# Patient Record
Sex: MALE | Race: WHITE | NOT HISPANIC OR LATINO | Employment: OTHER | URBAN - METROPOLITAN AREA
[De-identification: names, ages, dates, MRNs, and addresses within clinical notes are randomized per-mention and may not be internally consistent; named-entity substitution may affect disease eponyms.]

---

## 2017-09-18 DIAGNOSIS — R93.89 ABNORMAL CT OF THE CHEST: Primary | ICD-10-CM

## 2017-09-21 ENCOUNTER — HOSPITAL ENCOUNTER (OUTPATIENT)
Dept: RADIOLOGY | Facility: HOSPITAL | Age: 62
Discharge: HOME | End: 2017-09-21
Attending: INTERNAL MEDICINE
Payer: COMMERCIAL

## 2017-09-21 DIAGNOSIS — R93.89 ABNORMAL CT OF THE CHEST: ICD-10-CM

## 2017-09-21 PROCEDURE — 71250 CT THORAX DX C-: CPT | Mod: 26,,, | Performed by: RADIOLOGY

## 2017-09-21 PROCEDURE — 71250 CT THORAX DX C-: CPT | Mod: TC

## 2017-09-26 ENCOUNTER — PATIENT MESSAGE (OUTPATIENT)
Dept: PULMONOLOGY | Facility: CLINIC | Age: 62
End: 2017-09-26

## 2019-04-30 ENCOUNTER — TELEPHONE (OUTPATIENT)
Dept: PULMONOLOGY | Facility: CLINIC | Age: 64
End: 2019-04-30

## 2019-04-30 DIAGNOSIS — J92.0 PLEURAL PLAQUE DUE TO ASBESTOS EXPOSURE: Primary | ICD-10-CM

## 2019-04-30 NOTE — TELEPHONE ENCOUNTER
----- Message from Katie Anthony MA sent at 4/29/2019  3:39 PM CDT -----  Contact: Self [phone]  Dr. Maddox can you please put an CT order in for this patient?    Thank You, Katie  ----- Message -----  From: Etta Hernandez  Sent: 4/29/2019   3:06 PM  To: [facility] Staff    PT is requesting an CT order. He prefers to have it done in [place].

## 2019-04-30 NOTE — TELEPHONE ENCOUNTER
----- Message from Katie Anthony MA sent at 4/29/2019  3:39 PM CDT -----  Contact: Self [phone]  Dr. Maddox can you please put an CT order in for this patient?    Thank You, Katie  ----- Message -----  From: Etta Hernandez  Sent: 4/29/2019   3:06 PM  To: [facility] Staff    PT is requesting an CT order. He prefers to have it done in [place].

## 2019-05-07 ENCOUNTER — HOSPITAL ENCOUNTER (OUTPATIENT)
Dept: RADIOLOGY | Facility: HOSPITAL | Age: 64
Discharge: HOME | End: 2019-05-07
Attending: INTERNAL MEDICINE
Payer: MEDICARE

## 2019-05-07 DIAGNOSIS — J92.0 PLEURAL PLAQUE DUE TO ASBESTOS EXPOSURE: ICD-10-CM

## 2019-05-07 PROCEDURE — 71250 CT THORAX DX C-: CPT | Mod: 26,,, | Performed by: RADIOLOGY

## 2019-05-07 PROCEDURE — 71250 CT THORAX DX C-: CPT | Mod: TC

## 2019-05-07 PROCEDURE — 71250 CT CHEST WITHOUT CONTRAST: ICD-10-PCS | Mod: 26,,, | Performed by: RADIOLOGY

## 2019-06-27 ENCOUNTER — OFFICE VISIT (OUTPATIENT)
Dept: PULMONOLOGY | Facility: CLINIC | Age: 64
End: 2019-06-27
Payer: MEDICARE

## 2019-06-27 VITALS
HEART RATE: 72 BPM | SYSTOLIC BLOOD PRESSURE: 124 MMHG | HEIGHT: 71 IN | OXYGEN SATURATION: 97 % | BODY MASS INDEX: 23.06 KG/M2 | DIASTOLIC BLOOD PRESSURE: 68 MMHG | WEIGHT: 164.69 LBS

## 2019-06-27 DIAGNOSIS — J92.0 PLEURAL PLAQUE DUE TO ASBESTOS EXPOSURE: ICD-10-CM

## 2019-06-27 PROCEDURE — 99999 PR PBB SHADOW E&M-EST. PATIENT-LVL III: ICD-10-PCS | Mod: PBBFAC,,, | Performed by: INTERNAL MEDICINE

## 2019-06-27 PROCEDURE — 3008F PR BODY MASS INDEX (BMI) DOCUMENTED: ICD-10-PCS | Mod: CPTII,S$GLB,, | Performed by: INTERNAL MEDICINE

## 2019-06-27 PROCEDURE — 3008F BODY MASS INDEX DOCD: CPT | Mod: CPTII,S$GLB,, | Performed by: INTERNAL MEDICINE

## 2019-06-27 PROCEDURE — 99213 OFFICE O/P EST LOW 20 MIN: CPT | Mod: S$GLB,,, | Performed by: INTERNAL MEDICINE

## 2019-06-27 PROCEDURE — 99999 PR PBB SHADOW E&M-EST. PATIENT-LVL III: CPT | Mod: PBBFAC,,, | Performed by: INTERNAL MEDICINE

## 2019-06-27 PROCEDURE — 99213 PR OFFICE/OUTPT VISIT, EST, LEVL III, 20-29 MIN: ICD-10-PCS | Mod: S$GLB,,, | Performed by: INTERNAL MEDICINE

## 2019-06-27 RX ORDER — OMEGA-3 FATTY ACIDS 1000 MG
2 CAPSULE ORAL
COMMUNITY

## 2019-06-27 RX ORDER — GABAPENTIN 100 MG/1
CAPSULE ORAL
COMMUNITY
Start: 2019-06-25

## 2019-06-27 RX ORDER — TIZANIDINE HYDROCHLORIDE 4 MG/1
CAPSULE, GELATIN COATED ORAL
Refills: 0 | COMMUNITY
Start: 2019-04-28 | End: 2019-06-27

## 2019-06-27 RX ORDER — ETODOLAC 400 MG/1
TABLET, FILM COATED ORAL
COMMUNITY
Start: 2019-06-25

## 2019-06-27 RX ORDER — ROSUVASTATIN CALCIUM 20 MG/1
TABLET, COATED ORAL
Refills: 3 | COMMUNITY
Start: 2019-05-04

## 2019-06-27 RX ORDER — TRAMADOL HYDROCHLORIDE 50 MG/1
TABLET ORAL
COMMUNITY
Start: 2019-06-26

## 2019-06-27 RX ORDER — KETOROLAC TROMETHAMINE 10 MG/1
TABLET, FILM COATED ORAL
Refills: 0 | COMMUNITY
Start: 2019-04-28

## 2019-06-27 NOTE — PROGRESS NOTES
"Subjective:       Patient ID: Kanu Perry is a 64 y.o. male.    Chief Complaint: Abnormal Ct Scan    64 year old who grew up around [facility] and has asbestos exposure as a child.  Sister is Christine Thomas who is a patient of mine that I diagnosed with mesothelioma.  Patient denies fever.  No dyspnea.  No cough. No pleuritic type chest pain.    In 2003 had a pleural biopsy with decortication (don't have results)    In 2003 had a pleural biopsy with decortication (don't have results)     Review of Systems   Respiratory: Positive for dyspnea on extertion (intermittent with humidity in the morning.).        Objective:       Vitals:    06/27/19 1125   BP: 124/68   BP Location: Right arm   Patient Position: Sitting   Pulse: 72   SpO2: 97%   Weight: 74.7 kg (164 lb 10.9 oz)   Height: 5' 11" (1.803 m)     Physical Exam     Personal Diagnostic Review  CT of chest performed on 5/7/2019 without contrast revealed Pleural plaque calcifications may be associated with prior asbestos exposure.    Round atelectasis right lung base associated with pleural plaque thickening and trace of accumulated pleural fluid, unchanged. From all previous imaging.  Pulmonary Function Tests 6/27/2019   SpO2 97   Height 71.000   Weight 2634.94   BMI (Calculated) 23   Some recent data might be hidden         Assessment:       1. 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      cyclobenzaprine (FLEXERIL) 10 MG tablet Take 10 mg by mouth 3 (three) times daily as needed.       etodolac (LODINE) 400 MG tablet       fish oil-omega-3 fatty acids 300-1,000 mg capsule Take 2 g by mouth once daily.      gabapentin (NEURONTIN) 100 MG capsule       ketorolac (TORADOL) 10 mg tablet TK 1 T PO  TID PRN  0    multivitamin capsule Take 1 capsule by mouth.      omega-3 fatty acids 1,000 mg Cap Take 2 " g by mouth.      ramipril (ALTACE) 5 MG capsule Take 5 mg by mouth once daily.       rosuvastatin (CRESTOR) 20 MG tablet TK 1 T PO D  3    tamsulosin (FLOMAX) 0.4 mg Cp24 0.4 mg once daily.       vitamin D 1000 units Tab Take 2,000 Units by mouth.      traMADol (ULTRAM) 50 mg tablet       [DISCONTINUED] meloxicam (MOBIC) 7.5 MG tablet TAKE ONE TABLET BY MOUTH EVERY DAY 30 tablet 2    [DISCONTINUED] pravastatin (PRAVACHOL) 40 MG tablet Take 40 mg by mouth once daily.       [DISCONTINUED] tiZANidine 4 mg Cap TK 2 CS PO Q 8 H FOR 5 DAYS  0     No facility-administered encounter medications on file as of 6/27/2019.      No orders of the defined types were placed in this encounter.    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    At this point, would only recommend yearly CXR per PCP if they have a baseline on file.    May return to pulmonary if symptoms should develop or radiographs change.           Current Assessment & Plan     I spent 25minutes with the patient and more than half the time was spent discussing diagnosis and treatment.  I personally reviewed the patient's test results and medications past and present and the need to adhere to the plan put forth during this visit encounter as described above.

## 2019-06-29 NOTE — ASSESSMENT & PLAN NOTE
I spent 25minutes with the patient and more than half the time was spent discussing diagnosis and treatment.  I personally reviewed the patient's test results and medications past and present and the need to adhere to the plan put forth during this visit encounter as described above.